# Patient Record
Sex: MALE | Race: WHITE
[De-identification: names, ages, dates, MRNs, and addresses within clinical notes are randomized per-mention and may not be internally consistent; named-entity substitution may affect disease eponyms.]

---

## 2019-07-01 ENCOUNTER — HOSPITAL ENCOUNTER (EMERGENCY)
Dept: HOSPITAL 74 - JER | Age: 29
Discharge: HOME | End: 2019-07-01
Payer: COMMERCIAL

## 2019-07-01 VITALS — DIASTOLIC BLOOD PRESSURE: 57 MMHG | HEART RATE: 65 BPM | TEMPERATURE: 98 F | SYSTOLIC BLOOD PRESSURE: 101 MMHG

## 2019-07-01 VITALS — BODY MASS INDEX: 26.5 KG/M2
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
## 2019-07-01 DIAGNOSIS — B34.9: Primary | ICD-10-CM
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
## 2019-07-01 DIAGNOSIS — T67.5XXA: ICD-10-CM
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
## 2019-07-01 LAB
ALBUMIN SERPL-MCNC: 3.4 G/DL (ref 3.4–5)
ALP SERPL-CCNC: 41 U/L (ref 45–117)
ALT SERPL-CCNC: 32 U/L (ref 13–61)
ANION GAP SERPL CALC-SCNC: 7 MMOL/L (ref 8–16)
APPEARANCE UR: CLEAR
AST SERPL-CCNC: 28 U/L (ref 15–37)
BASOPHILS # BLD: 0.5 % (ref 0–2)
BILIRUB SERPL-MCNC: 0.6 MG/DL (ref 0.2–1)
BILIRUB UR STRIP.AUTO-MCNC: NEGATIVE MG/DL
BUN SERPL-MCNC: 6.4 MG/DL (ref 7–18)
CALCIUM SERPL-MCNC: 8.2 MG/DL (ref 8.5–10.1)
CHLORIDE SERPL-SCNC: 108 MMOL/L (ref 98–107)
CO2 SERPL-SCNC: 24 MMOL/L (ref 21–32)
COLOR UR: YELLOW
CREAT SERPL-MCNC: 0.7 MG/DL (ref 0.55–1.3)
DEPRECATED RDW RBC AUTO: 14.2 % (ref 11.9–15.9)
EOSINOPHIL # BLD: 1.6 % (ref 0–4.5)
GLUCOSE SERPL-MCNC: 91 MG/DL (ref 74–106)
HCT VFR BLD CALC: 44.8 % (ref 35.4–49)
HGB BLD-MCNC: 15.5 GM/DL (ref 11.7–16.9)
KETONES UR QL STRIP: (no result)
LEUKOCYTE ESTERASE UR QL STRIP.AUTO: NEGATIVE
LYMPHOCYTES # BLD: 18.6 % (ref 8–40)
MCH RBC QN AUTO: 29.8 PG (ref 25.7–33.7)
MCHC RBC AUTO-ENTMCNC: 34.5 G/DL (ref 32–35.9)
MCV RBC: 86.4 FL (ref 80–96)
MONOCYTES # BLD AUTO: 9 % (ref 3.8–10.2)
NEUTROPHILS # BLD: 70.3 % (ref 42.8–82.8)
NITRITE UR QL STRIP: NEGATIVE
PH UR: 7 [PH] (ref 5–8)
PLATELET # BLD AUTO: 156 K/MM3 (ref 134–434)
PMV BLD: 8.8 FL (ref 7.5–11.1)
POTASSIUM SERPLBLD-SCNC: 4.1 MMOL/L (ref 3.5–5.1)
PROT SERPL-MCNC: 6.9 G/DL (ref 6.4–8.2)
PROT UR QL STRIP: NEGATIVE
PROT UR QL STRIP: NEGATIVE
RBC # BLD AUTO: 5.18 M/MM3 (ref 4–5.6)
SODIUM SERPL-SCNC: 139 MMOL/L (ref 136–145)
SP GR UR: 1.01 (ref 1.01–1.03)
UROBILINOGEN UR STRIP-MCNC: 1 MG/DL (ref 0.2–1)
WBC # BLD AUTO: 6.9 K/MM3 (ref 4–10)

## 2019-07-01 PROCEDURE — 3E0337Z INTRODUCTION OF ELECTROLYTIC AND WATER BALANCE SUBSTANCE INTO PERIPHERAL VEIN, PERCUTANEOUS APPROACH: ICD-10-PCS

## 2019-07-01 NOTE — PDOC
Attending Attestation





- Resident


Resident Name: Alondra Spence





- ED Attending Attestation


I have performed the following: I have examined & evaluated the patient, The 

case was reviewed & discussed with the resident, I agree w/resident's findings 

& plan, Exceptions are as noted





- HPI


HPI: 





07/01/19 07:43


Mr Syd Edwards is a 30 yo M presenting to the ER with a complaint of fevers


Pt reports that he was in his usual state of health until 4 days ago


Pt states he was helping his friend move Friday, and was outside all day until 

9 PM!


Later that evening/night when he arrived home, he developed a headache.  


He then had another obligation the following day but was only out until 5pm


When he got home, his wife took temp and noted fever of up to 103. 


she applied cool compresses


He states he vomited once yesterday but thought it was due to something he ate. 


He has taken Advil for the headache.


Decreased, dark urine


Decreased appetite. 


He denies vision changes, cough, congestion, sore throat, abdominal pain, 

urinary symptoms, back pain, neck stiffness, rashes, numbness/tingling, injury, 

sick contacts, recent travel. 


07/01/19 07:52








- Physicial Exam


PE: 





07/01/19 07:29


GENERAL: The patient is in no acute distress, diaphoretic.


ENT: Ears normal, nares patent, oropharynx clear without exudates.  Moist 

mucous membranes. No tonsillar enlargement, no exudates


NECK: Normal range of motion, supple, no nuchal rigidity 


LUNGS: Breath sounds equal, No wheezes, ? crackles right base


HEART:Regular rate and rhythm, normal S1 and S2 without murmur, rub or gallop.


ABDOMEN: Soft, nontender, normoactive bowel sounds.   


EXTREMITIES: Normal range of motion, no edema.   


NEUROLOGICAL: Cranial nerves II through XII grossly intact.  Normal speech.  No 

focal neurological deficits. 


SKIN: Warm, Dry, normal turgor, no rashes or lesions noted.


07/01/19 07:51








- Medical Decision Making





07/01/19 07:51


 


30 yo M presenting with headaches and fevers


, Temp 99.1


Pt diaphoretic, sheets on his stretcher are soaked








DDx includes but not limited to dehydration, heat stroke/heat exhaustion, 

pneumonia, UTI, viral illness, tension ha


Meningitis unlikely given chronicity of symptoms and NO nuchal rigidity





Will do:


Labs


UA


CXR


IVF


Motrin


Zofran








07/01/19 11:06


 Laboratory Tests











  07/01/19 07/01/19 07/01/19





  07:45 08:14 09:10


 


WBC  6.9  


 


Hgb  15.5  


 


Hct  44.8  


 


Plt Count  156  


 


Neutrophils %  70.3  


 


BUN    6.4 L


 


Creatinine    0.7


 


Urine Blood   Negative 


 


Urine Nitrite   Negative 


 


Ur Leukocyte Esterase   Negative 








CXR not officially read but does not appear to reflect an infiltrate


Pt states he feels much better


I have asked him to stay vigilant


If fever returns he can take Tylenol and motrin in alternation


He should return to the ER for any other concerns or complaints 


He should follow up with his PMD within 2-3 days





Will discharge to home


Most likely - viral syndrome vs. heat exhaustion

## 2019-07-01 NOTE — PDOC
History of Present Illness





- General


Chief Complaint: Headache


Stated Complaint: HEADACHE


Time Seen by Provider: 07/01/19 06:57


History Source: Patient


Exam Limitations: No Limitations





- History of Present Illness


Initial Comments: 





07/01/19 07:19


Pt is a previously healthy 30yo M presenting to ED with complaints of HA, 

nausea and fever x3 days. Pt states he was helping his friend move Friday and 

later that evening/night developed a headache located in the temples associated 

with nausea. He went home and felt he had a fever of up to 103. He states the 

headache is better in the morning but gets worse through out the day. He states 

he vomited once yesterday but thought it was due to something he ate. He took 

Advil for the headache which helped a little. Endorses dark urine and low 

appetite. He denies vision changes, cough, congestion, sore throat, abdominal 

pain, urinary symptoms, back pain, neck stiffness, rashes, numbness/tingling, 

injury, sick contacts, recent travel. Lives at home with his wife and daughter. 





PMD: 


PMH: none


PSH: none


Meds: none


Allergies: nkda





Past History





- Past Medical History


Allergies/Adverse Reactions: 


 Allergies











Allergy/AdvReac Type Severity Reaction Status Date / Time


 


No Known Allergies Allergy   Verified 07/01/19 06:36











Home Medications: 


Ambulatory Orders





NK [No Known Home Medication]  07/01/19 











- Suicide/Smoking/Psychosocial Hx


Smoking History: Never smoked


Have you smoked in the past 12 months: No


Information on smoking cessation initiated: No


Hx Alcohol Use: No


Drug/Substance Use Hx: No





**Review of Systems





- Review of Systems


Constitutional: Yes: Fever, Loss of Appetite, Weakness.  No: Chills, Malaise, 

Night Sweats, Unexplained wgt Loss


HEENTM: No: Eye Pain, Blurred Vision, Nose Congestion, Throat Pain


Respiratory: No: Cough, Shortness of Breath


Cardiac (ROS): No: Chest Pain, Lightheadedness, Palpitations


ABD/GI: Yes: Nausea.  No: Constipated, Diarrhea, Vomiting, Abdominal cramping


: No: Burning, Dysuria, Flank Pain


Musculoskeletal: No: Back Pain, Joint Pain, Muscle Pain, Neck Pain


Integumentary: No: Dryness, Erythema, Lesions, Lumps


Neurological: Yes: Headache.  No: Numbness, Paresthesia, Tingling, Tremors, 

Weakness, Dizziness





*Physical Exam





- Vital Signs


 Last Vital Signs











Temp Pulse Resp BP Pulse Ox


 


 99.1 F   101 H  18   133/67   97 


 


 07/01/19 06:05  07/01/19 06:05  07/01/19 06:05  07/01/19 06:05  07/01/19 06:05














- Physical Exam


General Appearance: Yes: Nourished, Appropriately Dressed.  No: Apparent 

Distress


HEENT: positive: EOMI, KAITLYNN, Pharynx Normal, Other (dry oral mucosa).  negative

: Rhinorrhea


Neck: negative: Lymphadenopathy (R), Lymphadenopathy (L)


Respiratory/Chest: positive: Lungs Clear, Normal Breath Sounds.  negative: 

Crackles, Rhonchi, Wheezing


Cardiovascular: positive: Regular Rhythm, S1, S2, Tachycardia.  negative: Edema


Vascular Pulses: Carotid (R): 2+, Carotid (L): 2+, Dorsalis-Pedis (R): 2+, 

Doralis-Pedis (L): 2+


Gastrointestinal/Abdominal: positive: Normal Bowel Sounds, Soft.  negative: 

Tender


Musculoskeletal: negative: CVA Tenderness


Extremity: positive: Normal Capillary Refill.  negative: Pedal Edema


Integumentary: positive: Normal Color, Dry, Warm.  negative: Jaundice, Pale, 

Petechiae, Rash, Swelling


Neurologic: positive: CNs II-XII NML intact, Fully Oriented, Alert, Normal Mood/

Affect, Normal Response, Motor Strength 5/5





ED Treatment Course





- LABORATORY


CBC & Chemistry Diagram: 


 07/01/19 07:45





 07/01/19 09:10





Medical Decision Making





- Medical Decision Making





07/01/19 07:23


Pt is a previously healthy 30yo M presenting to ED with complaints of HA, 

nausea and fever x3 days. Pt states he was helping his friend move Friday and 

later that evening/night developed a headache located in the temples associated 

with nausea. He went home and felt he had a fever of up to 103. He states the 

headache is better in the morning but gets worse through out the day. He states 

he vomited once yesterday but thought it was due to something he ate. He took 

Advil for the headache which helped a little. Endorses dark urine and low 

appetite. He denies vision changes, cough, congestion, sore throat, abdominal 

pain, urinary symptoms, back pain, neck stiffness, rashes, numbness/tingling, 

injury, sick contacts, recent travel. Lives at home with his wife and daughter. 


   Vitals: tachycardia 101


   PE: Well appearing. dry mucous membranes. No neurological deficits, no joint 

swelling/stiffness, negative Brudzinsky, lungs cta, normal heart sounds/no 

rubs. 





DDx includes but not limited to dehydration, heat stroke/heat exhaustion, 

metabolic derrangements, malignancy, meningitis, viral illness, tension ha





Likely dehydration, will order cbc to check white count and cmp to monitor 

electrolytes. UA, UCX, CXR. Will hold off on Head CT given lack of neurological 

deficits. 


   -IV fluids, ibuprofen, zofran





Will reassess. Will try decadron if not improving. May need LP if WBC elevated 

or headache not improving. 





07/01/19 08:24


Patient reports feeling better, headache is gone. Labs still pending. 





07/01/19 09:49


No white count, urine has slight ketones. Chem hemolyzed, repeat sample sent. 


Pt has pmd at [place]. 





Labs wnl. Pt feeling better. Safe for dc home. Given return precautions and 

neurology referral. PT understands and agrees to plan. Rpt vitals normal. 











*DC/Admit/Observation/Transfer


Diagnosis at time of Disposition: 


Headache


Qualifiers:


 Headache type: unspecified Headache chronicity pattern: acute headache 

Intractability: not intractable Qualified Code(s): R51 - Headache








- Discharge Dispostion


Disposition: HOME


Condition at time of disposition: Improved


Decision to Admit order: No





- Referrals


Referrals: 


Bakari Wilder MD [Staff Physician] - 





- Patient Instructions


Printed Discharge Instructions:  DI for Headache


Additional Instructions: 


You were seen in the emergency room today for headache. 





This could be due to dehydration. 





Keep yourself well  hydrated, drink plenty of fluids and get rest. You can take 

ibuprofen or Advil for the headache as needed. 


Remember to make an appointment with your primary care doctor within the next 3 

days or so. You can also see a neurologist if you continue to get headaches. 

Information is provided below. 





Please come back to the emergency room if headache gets worse, you continue to 

have headaches despite taking medications and drinking fluids, you start having 

vision changes, you start vomiting, fever continues or gets worse or if any new 

concerning symptom develops. 





Thank you





- Post Discharge Activity